# Patient Record
Sex: FEMALE | Race: BLACK OR AFRICAN AMERICAN | NOT HISPANIC OR LATINO | Employment: OTHER | URBAN - METROPOLITAN AREA
[De-identification: names, ages, dates, MRNs, and addresses within clinical notes are randomized per-mention and may not be internally consistent; named-entity substitution may affect disease eponyms.]

---

## 2024-09-12 ENCOUNTER — APPOINTMENT (OUTPATIENT)
Dept: RADIOLOGY | Facility: CLINIC | Age: 74
End: 2024-09-12
Payer: MEDICARE

## 2024-11-11 ENCOUNTER — HOSPITAL ENCOUNTER (OUTPATIENT)
Dept: RADIOLOGY | Facility: CLINIC | Age: 74
Discharge: HOME | End: 2024-11-11
Payer: MEDICARE

## 2024-11-11 DIAGNOSIS — Z78.0 ASYMPTOMATIC MENOPAUSAL STATE: ICD-10-CM

## 2024-11-11 PROCEDURE — 77080 DXA BONE DENSITY AXIAL: CPT

## 2024-11-11 PROCEDURE — 77080 DXA BONE DENSITY AXIAL: CPT | Performed by: RADIOLOGY

## 2025-04-16 ENCOUNTER — APPOINTMENT (OUTPATIENT)
Dept: ENDOCRINOLOGY | Facility: CLINIC | Age: 75
End: 2025-04-16
Payer: MEDICARE

## 2025-04-16 VITALS
HEIGHT: 72 IN | WEIGHT: 176.4 LBS | BODY MASS INDEX: 23.89 KG/M2 | RESPIRATION RATE: 16 BRPM | HEART RATE: 62 BPM | DIASTOLIC BLOOD PRESSURE: 74 MMHG | SYSTOLIC BLOOD PRESSURE: 118 MMHG

## 2025-04-16 DIAGNOSIS — I10 HTN (HYPERTENSION), BENIGN: ICD-10-CM

## 2025-04-16 DIAGNOSIS — E11.9 TYPE 2 DIABETES MELLITUS WITHOUT COMPLICATION, WITHOUT LONG-TERM CURRENT USE OF INSULIN: Primary | ICD-10-CM

## 2025-04-16 DIAGNOSIS — E78.5 HYPERLIPIDEMIA, UNSPECIFIED HYPERLIPIDEMIA TYPE: ICD-10-CM

## 2025-04-16 RX ORDER — CARVEDILOL 25 MG/1
25 TABLET ORAL
COMMUNITY
Start: 2025-01-14 | End: 2026-01-14

## 2025-04-16 RX ORDER — METFORMIN HYDROCHLORIDE 500 MG/1
1 TABLET ORAL
COMMUNITY
Start: 2025-02-09

## 2025-04-16 RX ORDER — SPIRONOLACTONE 25 MG/1
25 TABLET ORAL
COMMUNITY
Start: 2025-01-14

## 2025-04-16 RX ORDER — ALBUTEROL SULFATE 90 UG/1
2 INHALANT RESPIRATORY (INHALATION) EVERY 6 HOURS PRN
COMMUNITY
Start: 2025-01-29

## 2025-04-16 RX ORDER — ROSUVASTATIN CALCIUM 40 MG/1
40 TABLET, COATED ORAL NIGHTLY
COMMUNITY

## 2025-04-16 RX ORDER — LOSARTAN POTASSIUM 50 MG/1
50 TABLET ORAL DAILY
COMMUNITY

## 2025-04-16 RX ORDER — METOPROLOL SUCCINATE 100 MG/1
TABLET, EXTENDED RELEASE ORAL
COMMUNITY

## 2025-04-16 ASSESSMENT — ENCOUNTER SYMPTOMS
FEVER: 0
DIARRHEA: 0
SHORTNESS OF BREATH: 0
FATIGUE: 0
NAUSEA: 0
PALPITATIONS: 0
CHILLS: 0
HEADACHES: 0
COUGH: 0
VOMITING: 0

## 2025-04-16 NOTE — ASSESSMENT & PLAN NOTE
A1c is great  Discussed med options: could switch to ozempic on pt assistance  She would like to pursue it  Paperwork given to her today

## 2025-04-16 NOTE — PROGRESS NOTES
Endocrinology: Follow up visit  Subjective   Patient ID: Omayra Fowler is a 74 y.o. female who presents for Diabetes (Type 2 ).    PCP: No primary care provider on file.    HPI  Dm2  Mounjaro 10 mg    Last seen 8/2022  At that time on xr metformin  Was doing well with A1c's but wanted to try to lose weight.  Mounjaro added and metformin stopped  Lost 10 lbs and A1c excellent but mounjaro has been getting expensive for her.    Feeling well except nervous about upcoming cardiac ablation.      Review of Systems   Constitutional:  Negative for chills, fatigue and fever.   Respiratory:  Negative for cough and shortness of breath.    Cardiovascular:  Negative for chest pain and palpitations.   Gastrointestinal:  Negative for diarrhea, nausea and vomiting.   Neurological:  Negative for headaches.       Problem List[1]     Home Meds:  Current Outpatient Medications   Medication Instructions    albuterol 90 mcg/actuation inhaler 2 puffs, Every 6 hours PRN    carvedilol (COREG) 25 mg    losartan (COZAAR) 50 mg, Daily    metFORMIN (Glucophage) 500 mg tablet 1 tablet, Every 12 hours scheduled (0630,1830)    metoprolol succinate XL (Toprol-XL) 100 mg 24 hr tablet PLEASE SEE ATTACHED FOR DETAILED DIRECTIONS    rosuvastatin (CRESTOR) 40 mg, Nightly    spironolactone (ALDACTONE) 25 mg, Daily RT    tirzepatide 10 mg, Weekly        RX Allergies[2]     Objective   Vitals:    04/16/25 1125   BP: 118/74   Pulse: 62   Resp: 16      Vitals:    04/16/25 1125   Weight: 80 kg (176 lb 6.4 oz)      Body mass index is 23.27 kg/m².   Physical Exam  Constitutional:       Appearance: Normal appearance. She is normal weight.   HENT:      Head: Normocephalic and atraumatic.   Neck:      Thyroid: No thyroid mass, thyromegaly or thyroid tenderness.   Cardiovascular:      Rate and Rhythm: Normal rate and regular rhythm.      Heart sounds: No murmur heard.     No gallop.   Pulmonary:      Effort: Pulmonary effort is normal.      Breath sounds: Normal  "breath sounds.   Abdominal:      Palpations: Abdomen is soft.      Comments: benign   Neurological:      General: No focal deficit present.      Mental Status: She is alert and oriented to person, place, and time.      Deep Tendon Reflexes: Reflexes are normal and symmetric.   Psychiatric:         Behavior: Behavior is cooperative.         Labs:  Lab Results   Component Value Date    HGBA1C 6.6 (H) 12/11/2024      No results found for: \"PR1\", \"THYROIDPAB\", \"TSI\"     Assessment/Plan   Assessment & Plan  Type 2 diabetes mellitus without complication, without long-term current use of insulin    A1c is great  Discussed med options: could switch to ozempic on pt assistance  She would like to pursue it  Paperwork given to her today  Hyperlipidemia, unspecified hyperlipidemia type    Stable on statin  HTN (hypertension), benign    Bp excellent      Electronically signed by:  Keyanna Mathew MD 04/16/25 11:25 AM                   [1]   Patient Active Problem List  Diagnosis    HTN (hypertension), benign    Hyperlipidemia    Type 2 diabetes mellitus   [2]   Allergies  Allergen Reactions    Penicillins Anaphylaxis, Hives and Itching     Patient stated started itching and became pale in the face. A itching sensation all over her body became intolerable. Patient went to the ER and was given a shot. Does not know name of vaccine it was 30 years ago.    Sulfamethoxazole-Trimethoprim Itching    Sulfa (Sulfonamide Antibiotics) Hives     "

## 2025-04-16 NOTE — PATIENT INSTRUCTIONS
Keep up the good work with your sugars  Fill out paperwork for ozempic and drop off   Follow up in 6 months  Please call or message with concerns

## 2025-05-29 ENCOUNTER — APPOINTMENT (OUTPATIENT)
Dept: ENDOCRINOLOGY | Facility: CLINIC | Age: 75
End: 2025-05-29
Payer: MEDICARE

## 2025-10-15 ENCOUNTER — APPOINTMENT (OUTPATIENT)
Dept: ENDOCRINOLOGY | Facility: CLINIC | Age: 75
End: 2025-10-15
Payer: MEDICARE